# Patient Record
Sex: MALE | Race: WHITE | NOT HISPANIC OR LATINO | URBAN - METROPOLITAN AREA
[De-identification: names, ages, dates, MRNs, and addresses within clinical notes are randomized per-mention and may not be internally consistent; named-entity substitution may affect disease eponyms.]

---

## 2024-11-19 ENCOUNTER — HOME VISIT (OUTPATIENT)
Dept: POST ACUTE CARE | Facility: EXTERNAL LOCATION | Age: 89
End: 2024-11-19

## 2024-11-19 DIAGNOSIS — I25.118 CORONARY ARTERY DISEASE OF NATIVE ARTERY OF NATIVE HEART WITH STABLE ANGINA PECTORIS: Primary | ICD-10-CM

## 2024-11-19 DIAGNOSIS — R35.1 NOCTURIA: ICD-10-CM

## 2024-11-19 DIAGNOSIS — G25.0 ESSENTIAL TREMOR: ICD-10-CM

## 2024-11-19 DIAGNOSIS — I43 CARDIOMYOPATHY, HYPERTENSIVE, BENIGN, WITHOUT HEART FAILURE: ICD-10-CM

## 2024-11-19 DIAGNOSIS — E78.5 HYPERLIPIDEMIA, UNSPECIFIED HYPERLIPIDEMIA TYPE: ICD-10-CM

## 2024-11-19 DIAGNOSIS — M19.90 OSTEOARTHRITIS, UNSPECIFIED OSTEOARTHRITIS TYPE, UNSPECIFIED SITE: ICD-10-CM

## 2024-11-19 DIAGNOSIS — I11.9 CARDIOMYOPATHY, HYPERTENSIVE, BENIGN, WITHOUT HEART FAILURE: ICD-10-CM

## 2024-11-28 PROBLEM — I11.9 CARDIOMYOPATHY, HYPERTENSIVE, BENIGN, WITHOUT HEART FAILURE: Status: ACTIVE | Noted: 2024-11-28

## 2024-11-28 PROBLEM — I25.118 CORONARY ARTERY DISEASE OF NATIVE ARTERY OF NATIVE HEART WITH STABLE ANGINA PECTORIS: Status: ACTIVE | Noted: 2024-11-28

## 2024-11-28 PROBLEM — M19.90 OSTEOARTHRITIS: Status: ACTIVE | Noted: 2024-11-28

## 2024-11-28 PROBLEM — E78.5 HYPERLIPIDEMIA: Status: ACTIVE | Noted: 2024-11-28

## 2024-11-28 PROBLEM — I43 CARDIOMYOPATHY, HYPERTENSIVE, BENIGN, WITHOUT HEART FAILURE: Status: ACTIVE | Noted: 2024-11-28

## 2024-11-28 PROBLEM — G25.0 ESSENTIAL TREMOR: Status: ACTIVE | Noted: 2024-11-28

## 2024-11-28 PROBLEM — R35.1 NOCTURIA: Status: ACTIVE | Noted: 2024-11-28

## 2024-11-28 NOTE — PROGRESS NOTES
Danny Hughes   89 y.o.  male  @location@            Assessment and Plan:  History and physical    1. CAD (coronary artery disease) I25.10 Controlled. Continue atorvastatin prescribed by Dr. Mock.     2. HTN (hypertension) I10 Controlled. Continue metoprolol prescribed by Dr. Mock.      3. HLD (hyperlipidemia) E78.5 Controlled.      4. Essential tremor G25.0 Controlled with primidone. Continue same dose.     5. Nocturia R35.1 Urinalysis negative in office. Likely reflects post effects of prostate treatment.    6. Osteoarthritis M19.90 Symptoms are chronic, but unchanged. Should continue use of cane and walker.    7. Use of cane as ambulatory aid Z99.89     -Fall prevention    -Cognitive engagement     -Monitor and treat blood pressure     -Aggressive decubitus ulcer prevention.     -Bowel and bladder care     -Optimal nutrition and supplementation as needed     -GI  and DVT prophylaxis     -Symptom control     -Ambulation as tolerated     -Will follow    Charting is done using voice recognition software and may contain errors which have not been completely corrected    I discussed advanced care planning for more than 16 minutes including the explanation and discussion of advanced directives. Information and advise was also provided on DO NOT RESUSCITATE and patient encouraged to consider this  Patient is not sure about DNR at this time.      Source of history: Nurse, Medical personnel, Medical record, Patient.  History limitation: None.    HPI:  History and physical    Patient is unable to give any detailed history and therefore history is obtained from the chart  No acute complaints voiced by the patient or acute concerns raised by nursing    Patient is 88-year-old male with hypertension, coronary artery disease, hypercholesterolemia, ischemic cardiomyopathy (Dr. Mock), prostate cancer (PSA level 70-80; followed by Dr. Calvo), essential tremor, degenerative disc disease of the lumbar spine and chronic  sciatica.      Hospitalized March 2023 at [facility] because of severe right groin cellulitis. Received IV antibiotics. Followed by wound care and discharged on March 30: Completely healed cellulitis and intertrigo.  Patient/family declined GI scope in the hospital;    Problem List/Past Medical History     Ongoing   Anemia    Atrophy of muscle of left lower leg    Balance problems    CAD (coronary artery disease)    Candidal intertrigo    Cellulitis of right groin    Chronic ulcer of foot    Constipation    Coronary artery disease    Decreased vision in both eyes    Dermatophytosis, nail    Driving safety issue    Dysphagia    Essential tremor    Excessive cerumen in right ear canal    Fall at home    Fall down stairs    Hearing impaired    HLD (hyperlipidemia)    HTN (hypertension)    Left sciatic nerve pain    Medicare annual wellness visit, subsequent    MI, old    Muscle weakness    Nausea    Nocturia    Osteoarthritis    Peripheral vascular disease    Problem related to primary support group, unspecified    Prostate cancer    Scalp lesion    Scanning speech    Tremors of nervous system    Use of cane as ambulatory aid    Vitamin D deficiency    Weight loss, abnormal    Wound of right groin      Procedure/Surgical History   cataract extraction, bilat   mastoidectomy, right at 4 years of age   cardiac stent x 1 in 2005      Medications     aspirin(Aspirin Low Dose 81 mg oral tablet, chewable), 81 mg= 1 tabs, DAILY    atorvastatin(atorvastatin 10 mg oral tablet), 10 mg= 1 tabs, ORAL, DAILY, 3 refills    cholecalciferol(Vitamin D3 50 mcg (2000 intl units) oral capsule), 50 mcg= 1 caps, ORAL, DAILY, 3 refills    metoprolol(metoprolol succinate 25 mg oral tablet, extended release), 25 mg= 1 tabs, ORAL, DAILY, 3 refills    nitroglycerin(Nitrostat 0.4 mg sublingual tablet), 0.4 mg= 1 tabs, Sublingual, Q5MIN, PRN    nystatin topical = Mycostatin(Nyamyc 100,000 units/g topical powder), 1 warren, Topical, BID, 11  refills    Prescription DME(STANDARD FRAME WALKER), See Instructions    Prescription DME(PARKING PLACARD), See Instructions    Prescription DME(PARKING PLACARD), See Instructions    Prescription DME(DRIVING EVALUATION), See Instructions    primidone(primidone 50 mg oral tablet), 50 mg= 1 tabs, ORAL, QHS, 1 refills      Allergies     Vasotec    diclofenac    propranolol      Social History       Alcohol    Use:Current    Type:Liquor    Frequency:3-5 times per week    Previous treatment:None    *Have you ever felt you should cut down on drinking?No    *Have people annoyed you by criticizing your drinking?No    *Have you ever felt bad or guilty about your drinking?No    *Have you ever taken an eye-opener to get rid of a hangover?No      Domestic Concerns    Feels highly stressed:No      Employment/School    Status:Retired    Description:Retired     Highest education:Post graduate degree(s)      Exercise    Times per week:Daily    Exercise type:Walking      Home/Environment    Lives with:Spouse    *Living Situation Prior to AdmissionHome/Independent    Home equipment:Walker/Cane    Monitoring Equipment in homeblood pressure cuff    *Special Services and Community Resources Prior to AdmissionNone    *Mobility Assistance Prior to AdmissionIndependent    Home BarriersExternal Stairs, Internal Stairs    *Will patient require additional/new services upon discharge?No      Substance Abuse - Denies Substance Abuse      Tobacco    Type:Cigarettes    Stopped at age:25Years    Previous treatment:None      Family History     Cerebrovascular accident: Other.    Hypertension: Other.      Physical Exam:  Vital signs as per nursing/MA documentation were reviewed  General appearance: Alert and in no acute distress  HEENT: Normal Inspection  Neck - Normal Inspection  Respiratory : No respiratory distress. Lungs are clear   Cardiovascular: heart rate normal. No gallop  Back - normal inspection  Skin  inspection:Warm  Musculoskeletal : No deformities  Neuro : Limited exam. Baseline    ROS: Review of symptoms is negative except for what is mentioned in HPI    Results/Data  Records including but not limited to electronic medical records, relevant lab work and imaging from patient's health care facility encounter were reviewed and independently verified      Charting was completed using voice recognition technology and may include unintended errors.    Discussed with patient/family, RN, and NP.

## 2025-01-28 ENCOUNTER — HOME VISIT (OUTPATIENT)
Dept: POST ACUTE CARE | Facility: EXTERNAL LOCATION | Age: OVER 89
End: 2025-01-28
Payer: MEDICARE

## 2025-01-28 DIAGNOSIS — I43 CARDIOMYOPATHY, HYPERTENSIVE, BENIGN, WITHOUT HEART FAILURE: ICD-10-CM

## 2025-01-28 DIAGNOSIS — I11.9 CARDIOMYOPATHY, HYPERTENSIVE, BENIGN, WITHOUT HEART FAILURE: ICD-10-CM

## 2025-01-28 DIAGNOSIS — G25.0 ESSENTIAL TREMOR: ICD-10-CM

## 2025-01-28 DIAGNOSIS — M19.90 OSTEOARTHRITIS, UNSPECIFIED OSTEOARTHRITIS TYPE, UNSPECIFIED SITE: ICD-10-CM

## 2025-01-28 DIAGNOSIS — I25.118 CORONARY ARTERY DISEASE OF NATIVE ARTERY OF NATIVE HEART WITH STABLE ANGINA PECTORIS: Primary | ICD-10-CM

## 2025-01-28 PROCEDURE — 99349 HOME/RES VST EST MOD MDM 40: CPT | Performed by: EMERGENCY MEDICINE

## 2025-01-31 NOTE — PROGRESS NOTES
Danny Hughes   89 y.o.  male  @location@            Assessment and Plan:      1. CAD (coronary artery disease) I25.10 Controlled. Continue atorvastatin prescribed by Dr. Mock.     2. HTN (hypertension) I10 Controlled. Continue metoprolol prescribed by Dr. Mock.      3. HLD (hyperlipidemia) E78.5 Controlled.      4. Essential tremor G25.0 Controlled with primidone. Continue same dose.     5. Nocturia R35.1 Urinalysis negative in office. Likely reflects post effects of prostate treatment.    6. Osteoarthritis M19.90 Symptoms are chronic, but unchanged. Should continue use of cane and walker.    7. Use of cane as ambulatory aid Z99.89     -Fall prevention    -Cognitive engagement     -Monitor and treat blood pressure     -Aggressive decubitus ulcer prevention.     -Bowel and bladder care     -Optimal nutrition and supplementation as needed     -GI  and DVT prophylaxis     -Symptom control     -Ambulation as tolerated     -Will follow    Charting is done using voice recognition software and may contain errors which have not been completely corrected    -Fall prevention    -Cognitive engagement     -Monitor and treat blood pressure     -Aggressive decubitus ulcer prevention.     -Bowel and bladder care     -Optimal nutrition and supplementation as needed     -GI  and DVT prophylaxis     -Symptom control     -Ambulation as tolerated     -Will follow    Charting is done using voice recognition software and may contain errors which have not been completely corrected    Source of history: Nurse, Medical personnel, Medical record, Patient.  History limitation: None.    HPI:  History and physical    Patient is unable to give any detailed history and therefore history is obtained from the chart  No acute complaints voiced by the patient or acute concerns raised by nursing    Patient is 88-year-old male with hypertension, coronary artery disease, hypercholesterolemia, ischemic cardiomyopathy (Dr. Mock), prostate  cancer (PSA level 70-80; followed by Dr. Calvo), essential tremor, degenerative disc disease of the lumbar spine and chronic sciatica.      Hospitalized March 2023 at [facility] because of severe right groin cellulitis. Received IV antibiotics. Followed by wound care and discharged on March 30: Completely healed cellulitis and intertrigo.  Patient/family declined GI scope in the hospital;    Problem List/Past Medical History     Ongoing   Anemia    Atrophy of muscle of left lower leg    Balance problems    CAD (coronary artery disease)    Candidal intertrigo    Cellulitis of right groin    Chronic ulcer of foot    Constipation    Coronary artery disease    Decreased vision in both eyes    Dermatophytosis, nail    Driving safety issue    Dysphagia    Essential tremor    Excessive cerumen in right ear canal    Fall at home    Fall down stairs    Hearing impaired    HLD (hyperlipidemia)    HTN (hypertension)    Left sciatic nerve pain    Medicare annual wellness visit, subsequent    MI, old    Muscle weakness    Nausea    Nocturia    Osteoarthritis    Peripheral vascular disease    Problem related to primary support group, unspecified    Prostate cancer    Scalp lesion    Scanning speech    Tremors of nervous system    Use of cane as ambulatory aid    Vitamin D deficiency    Weight loss, abnormal    Wound of right groin      Procedure/Surgical History   cataract extraction, bilat   mastoidectomy, right at 4 years of age   cardiac stent x 1 in 2005      Medications     aspirin(Aspirin Low Dose 81 mg oral tablet, chewable), 81 mg= 1 tabs, DAILY    atorvastatin(atorvastatin 10 mg oral tablet), 10 mg= 1 tabs, ORAL, DAILY, 3 refills    cholecalciferol(Vitamin D3 50 mcg (2000 intl units) oral capsule), 50 mcg= 1 caps, ORAL, DAILY, 3 refills    metoprolol(metoprolol succinate 25 mg oral tablet, extended release), 25 mg= 1 tabs, ORAL, DAILY, 3 refills    nitroglycerin(Nitrostat 0.4 mg sublingual tablet), 0.4 mg= 1 tabs,  Sublingual, Q5MIN, PRN    nystatin topical = Mycostatin(Nyamyc 100,000 units/g topical powder), 1 warren, Topical, BID, 11 refills    Prescription DME(STANDARD FRAME WALKER), See Instructions    Prescription DME(PARKING PLACARD), See Instructions    Prescription DME(PARKING PLACARD), See Instructions    Prescription DME(DRIVING EVALUATION), See Instructions    primidone(primidone 50 mg oral tablet), 50 mg= 1 tabs, ORAL, QHS, 1 refills      Allergies     Vasotec    diclofenac    propranolol      Social History       Alcohol    Use:Current    Type:Liquor    Frequency:3-5 times per week    Previous treatment:None    *Have you ever felt you should cut down on drinking?No    *Have people annoyed you by criticizing your drinking?No    *Have you ever felt bad or guilty about your drinking?No    *Have you ever taken an eye-opener to get rid of a hangover?No      Domestic Concerns    Feels highly stressed:No      Employment/School    Status:Retired    Description:Retired     Highest education:Post graduate degree(s)      Exercise    Times per week:Daily    Exercise type:Walking      Home/Environment    Lives with:Spouse    *Living Situation Prior to AdmissionHome/Independent    Home equipment:Walker/Cane    Monitoring Equipment in homeblood pressure cuff    *Special Services and Community Resources Prior to AdmissionNone    *Mobility Assistance Prior to AdmissionIndependent    Home BarriersExternal Stairs, Internal Stairs    *Will patient require additional/new services upon discharge?No      Substance Abuse - Denies Substance Abuse      Tobacco    Type:Cigarettes    Stopped at age:25Years    Previous treatment:None      Family History     Cerebrovascular accident: Other.    Hypertension: Other.      Physical Exam:  Vital signs as per nursing/MA documentation were reviewed  General appearance: Alert and in no acute distress  HEENT: Normal Inspection  Neck - Normal Inspection  Respiratory : No respiratory distress. Lungs  are clear   Cardiovascular: heart rate normal. No gallop  Back - normal inspection  Skin inspection:Warm  Musculoskeletal : No deformities  Neuro : Limited exam. Baseline    ROS: Review of symptoms is negative except for what is mentioned in HPI    Results/Data  Records including but not limited to electronic medical records, relevant lab work and imaging from patient's health care facility encounter were reviewed and independently verified      Charting was completed using voice recognition technology and may include unintended errors.    Discussed with patient/family, RN, and NP.

## 2025-02-27 ENCOUNTER — HOME VISIT (OUTPATIENT)
Dept: POST ACUTE CARE | Facility: EXTERNAL LOCATION | Age: OVER 89
End: 2025-02-27
Payer: MEDICARE

## 2025-02-27 DIAGNOSIS — I11.9 CARDIOMYOPATHY, HYPERTENSIVE, BENIGN, WITHOUT HEART FAILURE: ICD-10-CM

## 2025-02-27 DIAGNOSIS — I43 CARDIOMYOPATHY, HYPERTENSIVE, BENIGN, WITHOUT HEART FAILURE: ICD-10-CM

## 2025-02-27 DIAGNOSIS — G25.0 ESSENTIAL TREMOR: ICD-10-CM

## 2025-02-27 DIAGNOSIS — M19.90 OSTEOARTHRITIS, UNSPECIFIED OSTEOARTHRITIS TYPE, UNSPECIFIED SITE: ICD-10-CM

## 2025-02-27 DIAGNOSIS — I25.118 CORONARY ARTERY DISEASE OF NATIVE ARTERY OF NATIVE HEART WITH STABLE ANGINA PECTORIS: Primary | ICD-10-CM

## 2025-02-27 PROCEDURE — 99348 HOME/RES VST EST LOW MDM 30: CPT | Performed by: EMERGENCY MEDICINE

## 2025-03-01 NOTE — PROGRESS NOTES
Danny Hughes   89 y.o.  male  @location@            Assessment and Plan:      1. CAD (coronary artery disease) I25.10 Controlled. Continue atorvastatin prescribed by Dr. Mock.     2. HTN (hypertension) I10 Controlled. Continue metoprolol prescribed by Dr. Mock.      3. HLD (hyperlipidemia) E78.5 Controlled.      4. Essential tremor G25.0 Controlled with primidone. Continue same dose.     5. Nocturia R35.1 Urinalysis negative in office. Likely reflects post effects of prostate treatment.    6. Osteoarthritis M19.90 Symptoms are chronic, but unchanged. Should continue use of cane and walker.    7. Use of cane as ambulatory aid Z99.89     -Fall prevention    -Cognitive engagement     -Monitor and treat blood pressure     -Aggressive decubitus ulcer prevention.     -Bowel and bladder care     -Optimal nutrition and supplementation as needed     -GI  and DVT prophylaxis     -Symptom control     -Ambulation as tolerated     -Will follow    Charting is done using voice recognition software and may contain errors which have not been completely corrected    Rx list reviewed.   PT and OT evaluation is in the process.   Routine safety measures, fall precautions, risk modification and alarm placement if needed for prevention of falls.   Skin care precautions, prevention of pressures sores at pressure points assessed.   Pt needs to be monitored frequently by nursing staff particularly at night time.   Any confusion, agitation or behavioural disturbance needs to be attended, as per home policy   rapid covid Ag assay need to be done, notify if positive.   If needed appropriate measures to be taken for alarm placements and assisted devices, pt was told not to get up and ambulate at night unless help and assist available at bedside,   labs will be done as per our routine protocol.   PO intake need to be monitored if consuming po.       Charting is done using voice recognition software and may contain errors which have not  been completely corrected      Source of history: Nurse, Medical personnel, Medical record, Patient.  History limitation: None.    HPI:  History and physical    Patient is unable to give any detailed history and therefore history is obtained from the chart  No acute complaints voiced by the patient or acute concerns raised by nursing    Patient is 88-year-old male with hypertension, coronary artery disease, hypercholesterolemia, ischemic cardiomyopathy (Dr. Mock), prostate cancer (PSA level 70-80; followed by Dr. Calvo), essential tremor, degenerative disc disease of the lumbar spine and chronic sciatica.      Hospitalized March 2023 at [facility] because of severe right groin cellulitis. Received IV antibiotics. Followed by wound care and discharged on March 30: Completely healed cellulitis and intertrigo.  Patient/family declined GI scope in the hospital;    Problem List/Past Medical History     Ongoing   Anemia    Atrophy of muscle of left lower leg    Balance problems    CAD (coronary artery disease)    Candidal intertrigo    Cellulitis of right groin    Chronic ulcer of foot    Constipation    Coronary artery disease    Decreased vision in both eyes    Dermatophytosis, nail    Driving safety issue    Dysphagia    Essential tremor    Excessive cerumen in right ear canal    Fall at home    Fall down stairs    Hearing impaired    HLD (hyperlipidemia)    HTN (hypertension)    Left sciatic nerve pain    Medicare annual wellness visit, subsequent    MI, old    Muscle weakness    Nausea    Nocturia    Osteoarthritis    Peripheral vascular disease    Problem related to primary support group, unspecified    Prostate cancer    Scalp lesion    Scanning speech    Tremors of nervous system    Use of cane as ambulatory aid    Vitamin D deficiency    Weight loss, abnormal    Wound of right groin      Procedure/Surgical History   cataract extraction, bilat   mastoidectomy, right at 4 years of age   cardiac stent x 1  in 2005      Medications     aspirin(Aspirin Low Dose 81 mg oral tablet, chewable), 81 mg= 1 tabs, DAILY    atorvastatin(atorvastatin 10 mg oral tablet), 10 mg= 1 tabs, ORAL, DAILY, 3 refills    cholecalciferol(Vitamin D3 50 mcg (2000 intl units) oral capsule), 50 mcg= 1 caps, ORAL, DAILY, 3 refills    metoprolol(metoprolol succinate 25 mg oral tablet, extended release), 25 mg= 1 tabs, ORAL, DAILY, 3 refills    nitroglycerin(Nitrostat 0.4 mg sublingual tablet), 0.4 mg= 1 tabs, Sublingual, Q5MIN, PRN    nystatin topical = Mycostatin(Nyamyc 100,000 units/g topical powder), 1 warren, Topical, BID, 11 refills    Prescription DME(STANDARD FRAME WALKER), See Instructions    Prescription DME(PARKING PLACARD), See Instructions    Prescription DME(PARKING PLACARD), See Instructions    Prescription DME(DRIVING EVALUATION), See Instructions    primidone(primidone 50 mg oral tablet), 50 mg= 1 tabs, ORAL, QHS, 1 refills      Allergies     Vasotec    diclofenac    propranolol      Social History       Alcohol    Use:Current    Type:Liquor    Frequency:3-5 times per week    Previous treatment:None    *Have you ever felt you should cut down on drinking?No    *Have people annoyed you by criticizing your drinking?No    *Have you ever felt bad or guilty about your drinking?No    *Have you ever taken an eye-opener to get rid of a hangover?No      Domestic Concerns    Feels highly stressed:No      Employment/School    Status:Retired    Description:Retired     Highest education:Post graduate degree(s)      Exercise    Times per week:Daily    Exercise type:Walking      Home/Environment    Lives with:Spouse    *Living Situation Prior to AdmissionHome/Independent    Home equipment:Walker/Cane    Monitoring Equipment in homeblood pressure cuff    *Special Services and Community Resources Prior to AdmissionNone    *Mobility Assistance Prior to AdmissionIndependent    Home BarriersExternal Stairs, Internal Stairs    *Will patient require  additional/new services upon discharge?No      Substance Abuse - Denies Substance Abuse      Tobacco    Type:Cigarettes    Stopped at age:25Years    Previous treatment:None      Family History     Cerebrovascular accident: Other.    Hypertension: Other.      Physical Exam:  Vital signs as per nursing/MA documentation were reviewed  General appearance: Alert and in no acute distress  HEENT: Normal Inspection  Neck - Normal Inspection  Respiratory : No respiratory distress. Lungs are clear   Cardiovascular: heart rate normal. No gallop  Back - normal inspection  Skin inspection:Warm  Musculoskeletal : No deformities  Neuro : Limited exam. Baseline    ROS: Review of symptoms is negative except for what is mentioned in HPI    Results/Data  Records including but not limited to electronic medical records, relevant lab work and imaging from patient's health care facility encounter were reviewed and independently verified      Charting was completed using voice recognition technology and may include unintended errors.    Discussed with patient/family, RN, and NP.

## 2025-03-20 ENCOUNTER — HOME VISIT (OUTPATIENT)
Dept: POST ACUTE CARE | Facility: EXTERNAL LOCATION | Age: OVER 89
End: 2025-03-20
Payer: MEDICARE

## 2025-03-20 DIAGNOSIS — I43 CARDIOMYOPATHY, HYPERTENSIVE, BENIGN, WITHOUT HEART FAILURE: Primary | ICD-10-CM

## 2025-03-20 DIAGNOSIS — G25.0 ESSENTIAL TREMOR: ICD-10-CM

## 2025-03-20 DIAGNOSIS — I11.9 CARDIOMYOPATHY, HYPERTENSIVE, BENIGN, WITHOUT HEART FAILURE: Primary | ICD-10-CM

## 2025-03-20 DIAGNOSIS — R35.1 NOCTURIA: ICD-10-CM

## 2025-03-20 DIAGNOSIS — E78.5 HYPERLIPIDEMIA, UNSPECIFIED HYPERLIPIDEMIA TYPE: ICD-10-CM

## 2025-03-20 PROCEDURE — 99349 HOME/RES VST EST MOD MDM 40: CPT | Performed by: EMERGENCY MEDICINE

## 2025-03-20 NOTE — PROGRESS NOTES
Danny Hughes   89 y.o.  male  @location@            Assessment and Plan:      1. CAD (coronary artery disease) I25.10 Controlled. Continue atorvastatin prescribed by Dr. Mock.     2. HTN (hypertension) I10 Controlled. Continue metoprolol prescribed by Dr. Mock.      3. HLD (hyperlipidemia) E78.5 Controlled.      4. Essential tremor G25.0 Controlled with primidone. Continue same dose.     5. Nocturia R35.1 Urinalysis negative in office. Likely reflects post effects of prostate treatment.    6. Osteoarthritis M19.90 Symptoms are chronic, but unchanged. Should continue use of cane and walker.    7. Use of cane as ambulatory aid Z99.89     1. medications are reviewed      2. Continue with rehabilitative, supportive, and or restorative care as ordered and as the patient tolerates     3. Laboratory evaluations will be monitored on an ongoing as needed basis     4. Medications have been cross-referenced with the patient's diagnoses list, and medications reductions have been considered and/or implemented.     5. Pharmacy recommendations are addressed on an ongoing as needed basis.     6. Controlled substances have been electronically scripted every 60 days for opiates and others of similar schedule, and every 6 months for sedative/hypnotics and others of similar schedule.     7. Nursing has been queried about any potential adverse events that need to be reported to me.    Salient information and adjustment of care plan pertaining to this individual patient interaction today are the following:      A. We will continue with restorative and supportive care as the patient tolerates    B. Laboratory examinations will continue to be drawn on an ongoing as-needed basis. The patient's weight needs to be monitored and if needed we may need to institute appetite stimulating medication    C. The patient's long term prognosis is guarded    Charting is done using voice recognition software and may contain errors which may not  have been completely corrected        Source of history: Nurse, Medical personnel, Medical record, Patient.  History limitation: None.    HPI:      Patient is unable to give any detailed history and therefore history is obtained from the chart  No acute complaints voiced by the patient or acute concerns raised by nursing    Patient is 88-year-old male with hypertension, coronary artery disease, hypercholesterolemia, ischemic cardiomyopathy (Dr. Mock), prostate cancer (PSA level 70-80; followed by Dr. Calvo), essential tremor, degenerative disc disease of the lumbar spine and chronic sciatica.      Hospitalized March 2023 at [facility] because of severe right groin cellulitis. Received IV antibiotics. Followed by wound care and discharged on March 30: Completely healed cellulitis and intertrigo.  Patient/family declined GI scope in the hospital;    Problem List/Past Medical History     Ongoing   Anemia    Atrophy of muscle of left lower leg    Balance problems    CAD (coronary artery disease)    Candidal intertrigo    Cellulitis of right groin    Chronic ulcer of foot    Constipation    Coronary artery disease    Decreased vision in both eyes    Dermatophytosis, nail    Driving safety issue    Dysphagia    Essential tremor    Excessive cerumen in right ear canal    Fall at home    Fall down stairs    Hearing impaired    HLD (hyperlipidemia)    HTN (hypertension)    Left sciatic nerve pain    Medicare annual wellness visit, subsequent    MI, old    Muscle weakness    Nausea    Nocturia    Osteoarthritis    Peripheral vascular disease    Problem related to primary support group, unspecified    Prostate cancer    Scalp lesion    Scanning speech    Tremors of nervous system    Use of cane as ambulatory aid    Vitamin D deficiency    Weight loss, abnormal    Wound of right groin      Procedure/Surgical History   cataract extraction, bilat   mastoidectomy, right at 4 years of age   cardiac stent x 1 in 2005       Medications     aspirin(Aspirin Low Dose 81 mg oral tablet, chewable), 81 mg= 1 tabs, DAILY    atorvastatin(atorvastatin 10 mg oral tablet), 10 mg= 1 tabs, ORAL, DAILY, 3 refills    cholecalciferol(Vitamin D3 50 mcg (2000 intl units) oral capsule), 50 mcg= 1 caps, ORAL, DAILY, 3 refills    metoprolol(metoprolol succinate 25 mg oral tablet, extended release), 25 mg= 1 tabs, ORAL, DAILY, 3 refills    nitroglycerin(Nitrostat 0.4 mg sublingual tablet), 0.4 mg= 1 tabs, Sublingual, Q5MIN, PRN    nystatin topical = Mycostatin(Nyamyc 100,000 units/g topical powder), 1 warren, Topical, BID, 11 refills    Prescription DME(STANDARD FRAME WALKER), See Instructions    Prescription DME(PARKING PLACARD), See Instructions    Prescription DME(PARKING PLACARD), See Instructions    Prescription DME(DRIVING EVALUATION), See Instructions    primidone(primidone 50 mg oral tablet), 50 mg= 1 tabs, ORAL, QHS, 1 refills      Allergies     Vasotec    diclofenac    propranolol      Social History       Alcohol    Use:Current    Type:Liquor    Frequency:3-5 times per week    Previous treatment:None    *Have you ever felt you should cut down on drinking?No    *Have people annoyed you by criticizing your drinking?No    *Have you ever felt bad or guilty about your drinking?No    *Have you ever taken an eye-opener to get rid of a hangover?No      Domestic Concerns    Feels highly stressed:No      Employment/School    Status:Retired    Description:Retired     Highest education:Post graduate degree(s)      Exercise    Times per week:Daily    Exercise type:Walking      Home/Environment    Lives with:Spouse    *Living Situation Prior to AdmissionHome/Independent    Home equipment:Walker/Cane    Monitoring Equipment in homeblood pressure cuff    *Special Services and Community Resources Prior to AdmissionNone    *Mobility Assistance Prior to AdmissionIndependent    Home BarriersExternal Stairs, Internal Stairs    *Will patient require  additional/new services upon discharge?No      Substance Abuse - Denies Substance Abuse      Tobacco    Type:Cigarettes    Stopped at age:25Years    Previous treatment:None      Family History     Cerebrovascular accident: Other.    Hypertension: Other.      Physical Exam:  Vital signs as per nursing/MA documentation were reviewed  General appearance: Alert and in no acute distress  HEENT: Normal Inspection  Neck - Normal Inspection  Respiratory : No respiratory distress. Lungs are clear   Cardiovascular: heart rate normal. No gallop  Back - normal inspection  Skin inspection:Warm  Musculoskeletal : No deformities  Neuro : Limited exam. Baseline    ROS: Review of symptoms is negative except for what is mentioned in HPI    Results/Data  Records including but not limited to electronic medical records, relevant lab work and imaging from patient's health care facility encounter were reviewed and independently verified      Charting was completed using voice recognition technology and may include unintended errors.    Discussed with patient/family, RN, and NP.

## 2025-04-22 ENCOUNTER — HOME VISIT (OUTPATIENT)
Dept: POST ACUTE CARE | Facility: EXTERNAL LOCATION | Age: OVER 89
End: 2025-04-22
Payer: MEDICARE

## 2025-04-22 DIAGNOSIS — I43 CARDIOMYOPATHY, HYPERTENSIVE, BENIGN, WITHOUT HEART FAILURE: ICD-10-CM

## 2025-04-22 DIAGNOSIS — I11.9 CARDIOMYOPATHY, HYPERTENSIVE, BENIGN, WITHOUT HEART FAILURE: ICD-10-CM

## 2025-04-22 DIAGNOSIS — I25.118 CORONARY ARTERY DISEASE OF NATIVE ARTERY OF NATIVE HEART WITH STABLE ANGINA PECTORIS: Primary | ICD-10-CM

## 2025-04-22 DIAGNOSIS — E78.5 HYPERLIPIDEMIA, UNSPECIFIED HYPERLIPIDEMIA TYPE: ICD-10-CM

## 2025-04-22 DIAGNOSIS — G25.0 ESSENTIAL TREMOR: ICD-10-CM

## 2025-04-26 NOTE — PROGRESS NOTES
Danny Hughes   89 y.o.  male  @location@            Assessment and Plan:      1. CAD (coronary artery disease) I25.10 Controlled. Continue atorvastatin prescribed by Dr. Mock.     2. HTN (hypertension) I10 Controlled. Continue metoprolol prescribed by Dr. Mock.      3. HLD (hyperlipidemia) E78.5 Controlled.      4. Essential tremor G25.0 Controlled with primidone. Continue same dose.     5. Nocturia R35.1 Urinalysis negative in office. Likely reflects post effects of prostate treatment.    6. Osteoarthritis M19.90 Symptoms are chronic, but unchanged. Should continue use of cane and walker.    7. Use of cane as ambulatory aid Z99.89     This patient was seen for my regular monthly visit, nursing evaluations and nursing notes were reviewed, interim events are reviewed, interim concerns and messages were reviewed as we have communicated with nursing staff.  Any issues with the falls, skin care impairment, declining physical condition are reviewed and noted, diagnosis list were reviewed, list of medications were reviewed, living will related issues were reviewed, overall patient has been doing well, any declining in patient's condition or any change in patient's condition needs to be notified to physician promptly, discussed with nursing staff, if needed would communicate with family.  Patient stays confined here at the facility for long-term care, there are always concerns about long-term care related issues and concerns.  Nursing staff is trying their best to keep patient safe, all sort of measures has been taken to keep patient safe and comfortable.         Source of history: Nurse, Medical personnel, Medical record, Patient.  History limitation: None.    HPI:      Patient is unable to give any detailed history and therefore history is obtained from the chart  No acute complaints voiced by the patient or acute concerns raised by nursing    Patient is 88-year-old male with hypertension, coronary artery  disease, hypercholesterolemia, ischemic cardiomyopathy (Dr. Mock), prostate cancer (PSA level 70-80; followed by Dr. Calvo), essential tremor, degenerative disc disease of the lumbar spine and chronic sciatica.      Hospitalized March 2023 at [facility] because of severe right groin cellulitis. Received IV antibiotics. Followed by wound care and discharged on March 30: Completely healed cellulitis and intertrigo.  Patient/family declined GI scope in the hospital;    Problem List/Past Medical History     Ongoing   Anemia    Atrophy of muscle of left lower leg    Balance problems    CAD (coronary artery disease)    Candidal intertrigo    Cellulitis of right groin    Chronic ulcer of foot    Constipation    Coronary artery disease    Decreased vision in both eyes    Dermatophytosis, nail    Driving safety issue    Dysphagia    Essential tremor    Excessive cerumen in right ear canal    Fall at home    Fall down stairs    Hearing impaired    HLD (hyperlipidemia)    HTN (hypertension)    Left sciatic nerve pain    Medicare annual wellness visit, subsequent    MI, old    Muscle weakness    Nausea    Nocturia    Osteoarthritis    Peripheral vascular disease    Problem related to primary support group, unspecified    Prostate cancer    Scalp lesion    Scanning speech    Tremors of nervous system    Use of cane as ambulatory aid    Vitamin D deficiency    Weight loss, abnormal    Wound of right groin      Procedure/Surgical History   cataract extraction, bilat   mastoidectomy, right at 4 years of age   cardiac stent x 1 in 2005      Medications     aspirin(Aspirin Low Dose 81 mg oral tablet, chewable), 81 mg= 1 tabs, DAILY    atorvastatin(atorvastatin 10 mg oral tablet), 10 mg= 1 tabs, ORAL, DAILY, 3 refills    cholecalciferol(Vitamin D3 50 mcg (2000 intl units) oral capsule), 50 mcg= 1 caps, ORAL, DAILY, 3 refills    metoprolol(metoprolol succinate 25 mg oral tablet, extended release), 25 mg= 1 tabs, ORAL, DAILY,  3 refills    nitroglycerin(Nitrostat 0.4 mg sublingual tablet), 0.4 mg= 1 tabs, Sublingual, Q5MIN, PRN    nystatin topical = Mycostatin(Nyamyc 100,000 units/g topical powder), 1 warren, Topical, BID, 11 refills    Prescription DME(STANDARD FRAME WALKER), See Instructions    Prescription DME(PARKING PLACARD), See Instructions    Prescription DME(PARKING PLACARD), See Instructions    Prescription DME(DRIVING EVALUATION), See Instructions    primidone(primidone 50 mg oral tablet), 50 mg= 1 tabs, ORAL, QHS, 1 refills      Allergies     Vasotec    diclofenac    propranolol      Social History       Alcohol    Use:Current    Type:Liquor    Frequency:3-5 times per week    Previous treatment:None    *Have you ever felt you should cut down on drinking?No    *Have people annoyed you by criticizing your drinking?No    *Have you ever felt bad or guilty about your drinking?No    *Have you ever taken an eye-opener to get rid of a hangover?No      Domestic Concerns    Feels highly stressed:No      Employment/School    Status:Retired    Description:Retired     Highest education:Post graduate degree(s)      Exercise    Times per week:Daily    Exercise type:Walking      Home/Environment    Lives with:Spouse    *Living Situation Prior to AdmissionHome/Independent    Home equipment:Walker/Cane    Monitoring Equipment in homeblood pressure cuff    *Special Services and Community Resources Prior to AdmissionNone    *Mobility Assistance Prior to AdmissionIndependent    Home BarriersExternal Stairs, Internal Stairs    *Will patient require additional/new services upon discharge?No      Substance Abuse - Denies Substance Abuse      Tobacco    Type:Cigarettes    Stopped at age:25Years    Previous treatment:None      Family History     Cerebrovascular accident: Other.    Hypertension: Other.      Physical Exam:  Vital signs as per nursing/MA documentation were reviewed  General appearance: Alert and in no acute distress  HEENT: Normal  Inspection  Neck - Normal Inspection  Respiratory : No respiratory distress. Lungs are clear   Cardiovascular: heart rate normal. No gallop  Back - normal inspection  Skin inspection:Warm  Musculoskeletal : No deformities  Neuro : Limited exam. Baseline    ROS: Review of symptoms is negative except for what is mentioned in HPI    Results/Data  Records including but not limited to electronic medical records, relevant lab work and imaging from patient's health care facility encounter were reviewed and independently verified      Charting was completed using voice recognition technology and may include unintended errors.    Discussed with patient/family, RN, and NP.

## 2025-05-27 ENCOUNTER — HOME VISIT (OUTPATIENT)
Dept: POST ACUTE CARE | Facility: EXTERNAL LOCATION | Age: OVER 89
End: 2025-05-27
Payer: MEDICARE

## 2025-05-27 DIAGNOSIS — I25.118 CORONARY ARTERY DISEASE OF NATIVE ARTERY OF NATIVE HEART WITH STABLE ANGINA PECTORIS: Primary | ICD-10-CM

## 2025-05-27 DIAGNOSIS — G25.0 ESSENTIAL TREMOR: ICD-10-CM

## 2025-05-27 DIAGNOSIS — I43 CARDIOMYOPATHY, HYPERTENSIVE, BENIGN, WITHOUT HEART FAILURE: ICD-10-CM

## 2025-05-27 DIAGNOSIS — M19.90 OSTEOARTHRITIS, UNSPECIFIED OSTEOARTHRITIS TYPE, UNSPECIFIED SITE: ICD-10-CM

## 2025-05-27 DIAGNOSIS — I11.9 CARDIOMYOPATHY, HYPERTENSIVE, BENIGN, WITHOUT HEART FAILURE: ICD-10-CM

## 2025-05-27 PROCEDURE — 99349 HOME/RES VST EST MOD MDM 40: CPT | Performed by: EMERGENCY MEDICINE

## 2025-05-29 NOTE — PROGRESS NOTES
Danny Hughes   89 y.o.  male  @location@            Assessment and Plan:      1. CAD (coronary artery disease) I25.10 Controlled. Continue atorvastatin prescribed by Dr. Mock.     2. HTN (hypertension) I10 Controlled. Continue metoprolol prescribed by Dr. Mock.      3. HLD (hyperlipidemia) E78.5 Controlled.      4. Essential tremor G25.0 Controlled with primidone. Continue same dose.     5. Nocturia R35.1 Urinalysis negative in office. Likely reflects post effects of prostate treatment.    6. Osteoarthritis M19.90 Symptoms are chronic, but unchanged. Should continue use of cane and walker.    7. Use of cane as ambulatory aid Z99.89     This patient was seen for my regular monthly visit, nursing evaluations and nursing notes were reviewed, interim events are reviewed, interim concerns and messages were reviewed as we have communicated with nursing staff.  Any issues with the falls, skin care impairment, declining physical condition are reviewed and noted, diagnosis list were reviewed, list of medications were reviewed, living will related issues were reviewed, overall patient has been doing well, any declining in patient's condition or any change in patient's condition needs to be notified to physician promptly, discussed with nursing staff, if needed would communicate with family.  Patient stays confined here at the facility for long-term care, there are always concerns about long-term care related issues and concerns.  Nursing staff is trying their best to keep patient safe, all sort of measures has been taken to keep patient safe and comfortable.   Skin integrity:  Nursing to monitor skin integrity as patient is at risk for pressure injuries.  Wound care per nursing  See Facility notes for measurements/assessments  Turn and reposition Q 2 hours or more  Air mattress and when up in chair cushion reducing device  Dietician to evaluate and recommend:  Nutritional supplement:  Please monitor skin integrity  Left message with estradiol level.  Informed patient that dr House would like her to begin IM estrogen 1 vial ordered to nick.  Instructed to call back for instructions.     and other pressure areas  Referral to wound clinic if appropriate:     Pt has been seen for follow up visit.  Recent nursing evaluation and notes were reviewed.   Overall, patient is stable despite his/her chronic conditions.      Any decline or change in condition needs to be communicated with the physician or myself.    Discussion with nursing staff regarding ongoing care and management.  If needed, would communicate with family who are not present at this time.   There are no concerns at this time.  We will continue with the medications noted above.    We will continue to follow the patient here at the facility.      *Please note that nursing facility, outside laboratory agency, and [facility] do not interface.         Source of history: Nurse, Medical personnel, Medical record, Patient.  History limitation: None.    HPI:      Patient is unable to give any detailed history and therefore history is obtained from the chart  No acute complaints voiced by the patient or acute concerns raised by nursing    Patient is 88-year-old male with hypertension, coronary artery disease, hypercholesterolemia, ischemic cardiomyopathy (Dr. Mock), prostate cancer (PSA level 70-80; followed by Dr. Calvo), essential tremor, degenerative disc disease of the lumbar spine and chronic sciatica.      Hospitalized March 2023 at [facility] because of severe right groin cellulitis. Received IV antibiotics. Followed by wound care and discharged on March 30: Completely healed cellulitis and intertrigo.  Patient/family declined GI scope in the hospital;    Problem List/Past Medical History     Ongoing   Anemia    Atrophy of muscle of left lower leg    Balance problems    CAD (coronary artery disease)    Candidal intertrigo    Cellulitis of right groin    Chronic ulcer of foot    Constipation    Coronary artery disease    Decreased vision in both eyes    Dermatophytosis, nail    Driving safety issue    Dysphagia    Essential  tremor    Excessive cerumen in right ear canal    Fall at home    Fall down stairs    Hearing impaired    HLD (hyperlipidemia)    HTN (hypertension)    Left sciatic nerve pain    Medicare annual wellness visit, subsequent    MI, old    Muscle weakness    Nausea    Nocturia    Osteoarthritis    Peripheral vascular disease    Problem related to primary support group, unspecified    Prostate cancer    Scalp lesion    Scanning speech    Tremors of nervous system    Use of cane as ambulatory aid    Vitamin D deficiency    Weight loss, abnormal    Wound of right groin      Procedure/Surgical History   cataract extraction, bilat   mastoidectomy, right at 4 years of age   cardiac stent x 1 in 2005      Medications     aspirin(Aspirin Low Dose 81 mg oral tablet, chewable), 81 mg= 1 tabs, DAILY    atorvastatin(atorvastatin 10 mg oral tablet), 10 mg= 1 tabs, ORAL, DAILY, 3 refills    cholecalciferol(Vitamin D3 50 mcg (2000 intl units) oral capsule), 50 mcg= 1 caps, ORAL, DAILY, 3 refills    metoprolol(metoprolol succinate 25 mg oral tablet, extended release), 25 mg= 1 tabs, ORAL, DAILY, 3 refills    nitroglycerin(Nitrostat 0.4 mg sublingual tablet), 0.4 mg= 1 tabs, Sublingual, Q5MIN, PRN    nystatin topical = Mycostatin(Nyamyc 100,000 units/g topical powder), 1 warren, Topical, BID, 11 refills    Prescription DME(STANDARD FRAME WALKER), See Instructions    Prescription DME(PARKING PLACARD), See Instructions    Prescription DME(PARKING PLACARD), See Instructions    Prescription DME(DRIVING EVALUATION), See Instructions    primidone(primidone 50 mg oral tablet), 50 mg= 1 tabs, ORAL, QHS, 1 refills      Allergies     Vasotec    diclofenac    propranolol      Social History       Alcohol    Use:Current    Type:Liquor    Frequency:3-5 times per week    Previous treatment:None    *Have you ever felt you should cut down on drinking?No    *Have people annoyed you by criticizing your drinking?No    *Have you ever felt bad or guilty about  your drinking?No    *Have you ever taken an eye-opener to get rid of a hangover?No      Domestic Concerns    Feels highly stressed:No      Employment/School    Status:Retired    Description:Retired     Highest education:Post graduate degree(s)      Exercise    Times per week:Daily    Exercise type:Walking      Home/Environment    Lives with:Spouse    *Living Situation Prior to AdmissionHome/Independent    Home equipment:Walker/Cane    Monitoring Equipment in homeblood pressure cuff    *Special Services and Community Resources Prior to AdmissionNone    *Mobility Assistance Prior to AdmissionIndependent    Home BarriersExternal Stairs, Internal Stairs    *Will patient require additional/new services upon discharge?No      Substance Abuse - Denies Substance Abuse      Tobacco    Type:Cigarettes    Stopped at age:25Years    Previous treatment:None      Family History     Cerebrovascular accident: Other.    Hypertension: Other.      Physical Exam:  Vital signs as per nursing/MA documentation were reviewed  General appearance: Alert and in no acute distress  HEENT: Normal Inspection  Neck - Normal Inspection  Respiratory : No respiratory distress. Lungs are clear   Cardiovascular: heart rate normal. No gallop  Back - normal inspection  Skin inspection:Warm  Musculoskeletal : No deformities  Neuro : Limited exam. Baseline    ROS: Review of symptoms is negative except for what is mentioned in HPI    Results/Data  Records including but not limited to electronic medical records, relevant lab work and imaging from patient's health care facility encounter were reviewed and independently verified      Charting was completed using voice recognition technology and may include unintended errors.    Discussed with patient/family, RN, and NP.

## 2025-06-17 ENCOUNTER — HOME VISIT (OUTPATIENT)
Dept: POST ACUTE CARE | Facility: EXTERNAL LOCATION | Age: OVER 89
End: 2025-06-17
Payer: MEDICARE

## 2025-06-17 DIAGNOSIS — M19.90 OSTEOARTHRITIS, UNSPECIFIED OSTEOARTHRITIS TYPE, UNSPECIFIED SITE: ICD-10-CM

## 2025-06-17 DIAGNOSIS — I25.118 CORONARY ARTERY DISEASE OF NATIVE ARTERY OF NATIVE HEART WITH STABLE ANGINA PECTORIS: Primary | ICD-10-CM

## 2025-06-17 DIAGNOSIS — I11.9 CARDIOMYOPATHY, HYPERTENSIVE, BENIGN, WITHOUT HEART FAILURE: ICD-10-CM

## 2025-06-17 DIAGNOSIS — I43 CARDIOMYOPATHY, HYPERTENSIVE, BENIGN, WITHOUT HEART FAILURE: ICD-10-CM

## 2025-06-17 DIAGNOSIS — G25.0 ESSENTIAL TREMOR: ICD-10-CM

## 2025-06-17 PROCEDURE — 99348 HOME/RES VST EST LOW MDM 30: CPT | Performed by: EMERGENCY MEDICINE

## 2025-06-29 NOTE — PROGRESS NOTES
Danny Hughes   89 y.o.  male  @location@            Assessment and Plan:      1. CAD (coronary artery disease) I25.10 Controlled. Continue atorvastatin prescribed by Dr. Mock.     2. HTN (hypertension) I10 Controlled. Continue metoprolol prescribed by Dr. Mock.      3. HLD (hyperlipidemia) E78.5 Controlled.      4. Essential tremor G25.0 Controlled with primidone. Continue same dose.     5. Nocturia R35.1 Urinalysis negative in office. Likely reflects post effects of prostate treatment.    6. Osteoarthritis M19.90 Symptoms are chronic, but unchanged. Should continue use of cane and walker.    7. Use of cane as ambulatory aid Z99.89     All available hospital and outpatient records have been reviewed. Will continue other current drug therapies as ordered on the continuation of care documents. Physical and Occupational Therapy will assess and treat per POC. Analgesia for identified pain symptoms. Continue the various medicines for bowel and bladder symptoms as well as the vitamins and supplements per hospital instructions. Will assess and provide local care to abnormalities of skin integrity. Drug to drug interaction data as noted by the pharmacy has been reviewed. The patient's condition warrants the continuation of these drugs as prescribed by the medical specialists. Discharge planning will be coordinated through the  department. I have reviewed any advanced directive documentation that is contained in the admission packet as directives indicating whether a surrogate decision maker has been identified.         Source of history: Nurse, Medical personnel, Medical record, Patient.  History limitation: None.    HPI:      Patient is unable to give any detailed history and therefore history is obtained from the chart  No acute complaints voiced by the patient or acute concerns raised by nursing    Patient is 88-year-old male with hypertension, coronary artery disease, hypercholesterolemia,  ischemic cardiomyopathy (Dr. Mock), prostate cancer (PSA level 70-80; followed by Dr. Calvo), essential tremor, degenerative disc disease of the lumbar spine and chronic sciatica.      Hospitalized March 2023 at [facility] because of severe right groin cellulitis. Received IV antibiotics. Followed by wound care and discharged on March 30: Completely healed cellulitis and intertrigo.  Patient/family declined GI scope in the hospital;    Problem List/Past Medical History     Ongoing   Anemia    Atrophy of muscle of left lower leg    Balance problems    CAD (coronary artery disease)    Candidal intertrigo    Cellulitis of right groin    Chronic ulcer of foot    Constipation    Coronary artery disease    Decreased vision in both eyes    Dermatophytosis, nail    Driving safety issue    Dysphagia    Essential tremor    Excessive cerumen in right ear canal    Fall at home    Fall down stairs    Hearing impaired    HLD (hyperlipidemia)    HTN (hypertension)    Left sciatic nerve pain    Medicare annual wellness visit, subsequent    MI, old    Muscle weakness    Nausea    Nocturia    Osteoarthritis    Peripheral vascular disease    Problem related to primary support group, unspecified    Prostate cancer    Scalp lesion    Scanning speech    Tremors of nervous system    Use of cane as ambulatory aid    Vitamin D deficiency    Weight loss, abnormal    Wound of right groin      Procedure/Surgical History   cataract extraction, bilat   mastoidectomy, right at 4 years of age   cardiac stent x 1 in 2005      Medications     aspirin(Aspirin Low Dose 81 mg oral tablet, chewable), 81 mg= 1 tabs, DAILY    atorvastatin(atorvastatin 10 mg oral tablet), 10 mg= 1 tabs, ORAL, DAILY, 3 refills    cholecalciferol(Vitamin D3 50 mcg (2000 intl units) oral capsule), 50 mcg= 1 caps, ORAL, DAILY, 3 refills    metoprolol(metoprolol succinate 25 mg oral tablet, extended release), 25 mg= 1 tabs, ORAL, DAILY, 3  refills    nitroglycerin(Nitrostat 0.4 mg sublingual tablet), 0.4 mg= 1 tabs, Sublingual, Q5MIN, PRN    nystatin topical = Mycostatin(Nyamyc 100,000 units/g topical powder), 1 warren, Topical, BID, 11 refills    Prescription DME(STANDARD FRAME WALKER), See Instructions    Prescription DME(PARKING PLACARD), See Instructions    Prescription DME(PARKING PLACARD), See Instructions    Prescription DME(DRIVING EVALUATION), See Instructions    primidone(primidone 50 mg oral tablet), 50 mg= 1 tabs, ORAL, QHS, 1 refills      Allergies     Vasotec    diclofenac    propranolol      Social History       Alcohol    Use:Current    Type:Liquor    Frequency:3-5 times per week    Previous treatment:None    *Have you ever felt you should cut down on drinking?No    *Have people annoyed you by criticizing your drinking?No    *Have you ever felt bad or guilty about your drinking?No    *Have you ever taken an eye-opener to get rid of a hangover?No      Domestic Concerns    Feels highly stressed:No      Employment/School    Status:Retired    Description:Retired     Highest education:Post graduate degree(s)      Exercise    Times per week:Daily    Exercise type:Walking      Home/Environment    Lives with:Spouse    *Living Situation Prior to AdmissionHome/Independent    Home equipment:Walker/Cane    Monitoring Equipment in homeblood pressure cuff    *Special Services and Community Resources Prior to AdmissionNone    *Mobility Assistance Prior to AdmissionIndependent    Home BarriersExternal Stairs, Internal Stairs    *Will patient require additional/new services upon discharge?No      Substance Abuse - Denies Substance Abuse      Tobacco    Type:Cigarettes    Stopped at age:25Years    Previous treatment:None      Family History     Cerebrovascular accident: Other.    Hypertension: Other.      Physical Exam:  Vital signs as per nursing/MA documentation were reviewed  General appearance: Alert and in no acute distress  HEENT: Normal  Inspection  Neck - Normal Inspection  Respiratory : No respiratory distress. Lungs are clear   Cardiovascular: heart rate normal. No gallop  Back - normal inspection  Skin inspection:Warm  Musculoskeletal : No deformities  Neuro : Limited exam. Baseline    ROS: Review of symptoms is negative except for what is mentioned in HPI    Results/Data  Records including but not limited to electronic medical records, relevant lab work and imaging from patient's health care facility encounter were reviewed and independently verified      Charting was completed using voice recognition technology and may include unintended errors.    Discussed with patient/family, RN, and NP.

## 2025-07-29 ENCOUNTER — HOME VISIT (OUTPATIENT)
Dept: POST ACUTE CARE | Facility: EXTERNAL LOCATION | Age: OVER 89
End: 2025-07-29
Payer: MEDICARE

## 2025-07-29 DIAGNOSIS — E78.5 HYPERLIPIDEMIA, UNSPECIFIED HYPERLIPIDEMIA TYPE: ICD-10-CM

## 2025-07-29 DIAGNOSIS — I25.118 CORONARY ARTERY DISEASE OF NATIVE ARTERY OF NATIVE HEART WITH STABLE ANGINA PECTORIS: ICD-10-CM

## 2025-07-29 DIAGNOSIS — I43 CARDIOMYOPATHY, HYPERTENSIVE, BENIGN, WITHOUT HEART FAILURE: ICD-10-CM

## 2025-07-29 DIAGNOSIS — G25.0 ESSENTIAL TREMOR: Primary | ICD-10-CM

## 2025-07-29 DIAGNOSIS — I11.9 CARDIOMYOPATHY, HYPERTENSIVE, BENIGN, WITHOUT HEART FAILURE: ICD-10-CM

## 2025-07-29 DIAGNOSIS — M19.90 OSTEOARTHRITIS, UNSPECIFIED OSTEOARTHRITIS TYPE, UNSPECIFIED SITE: ICD-10-CM

## 2025-07-29 PROCEDURE — 99349 HOME/RES VST EST MOD MDM 40: CPT | Performed by: EMERGENCY MEDICINE

## 2025-07-31 NOTE — PROGRESS NOTES
Danny Hughes   89 y.o.  male  @location@            Assessment and Plan:      1. CAD (coronary artery disease) I25.10 Controlled. Continue atorvastatin prescribed by Dr. Mock.     2. HTN (hypertension) I10 Controlled. Continue metoprolol prescribed by Dr. Mock.      3. HLD (hyperlipidemia) E78.5 Controlled.      4. Essential tremor G25.0 Controlled with primidone. Continue same dose.     5. Nocturia R35.1 Urinalysis negative in office. Likely reflects post effects of prostate treatment.    6. Osteoarthritis M19.90 Symptoms are chronic, but unchanged. Should continue use of cane and walker.    7. Use of cane as ambulatory aid Z99.89     -Fall prevention    -Cognitive engagement     -Monitor and treat blood pressure     -Aggressive decubitus ulcer prevention.     -Bowel and bladder care     -Optimal nutrition and supplementation as needed     -GI  and DVT prophylaxis     -Symptom control     -Ambulation as tolerated     -Will follow    Charting is done using voice recognition software and may contain errors which have not been completely corrected          Source of history: Nurse, Medical personnel, Medical record, Patient.  History limitation: None.    HPI:      Patient is unable to give any detailed history and therefore history is obtained from the chart  No acute complaints voiced by the patient or acute concerns raised by nursing    Patient is 88-year-old male with hypertension, coronary artery disease, hypercholesterolemia, ischemic cardiomyopathy (Dr. Mock), prostate cancer (PSA level 70-80; followed by Dr. Calvo), essential tremor, degenerative disc disease of the lumbar spine and chronic sciatica.      Hospitalized March 2023 at [facility] because of severe right groin cellulitis. Received IV antibiotics. Followed by wound care and discharged on March 30: Completely healed cellulitis and intertrigo.  Patient/family declined GI scope in the hospital;    Problem List/Past Medical History      Ongoing   Anemia    Atrophy of muscle of left lower leg    Balance problems    CAD (coronary artery disease)    Candidal intertrigo    Cellulitis of right groin    Chronic ulcer of foot    Constipation    Coronary artery disease    Decreased vision in both eyes    Dermatophytosis, nail    Driving safety issue    Dysphagia    Essential tremor    Excessive cerumen in right ear canal    Fall at home    Fall down stairs    Hearing impaired    HLD (hyperlipidemia)    HTN (hypertension)    Left sciatic nerve pain    Medicare annual wellness visit, subsequent    MI, old    Muscle weakness    Nausea    Nocturia    Osteoarthritis    Peripheral vascular disease    Problem related to primary support group, unspecified    Prostate cancer    Scalp lesion    Scanning speech    Tremors of nervous system    Use of cane as ambulatory aid    Vitamin D deficiency    Weight loss, abnormal    Wound of right groin      Procedure/Surgical History   cataract extraction, bilat   mastoidectomy, right at 4 years of age   cardiac stent x 1 in 2005      Medications     aspirin(Aspirin Low Dose 81 mg oral tablet, chewable), 81 mg= 1 tabs, DAILY    atorvastatin(atorvastatin 10 mg oral tablet), 10 mg= 1 tabs, ORAL, DAILY, 3 refills    cholecalciferol(Vitamin D3 50 mcg (2000 intl units) oral capsule), 50 mcg= 1 caps, ORAL, DAILY, 3 refills    metoprolol(metoprolol succinate 25 mg oral tablet, extended release), 25 mg= 1 tabs, ORAL, DAILY, 3 refills    nitroglycerin(Nitrostat 0.4 mg sublingual tablet), 0.4 mg= 1 tabs, Sublingual, Q5MIN, PRN    nystatin topical = Mycostatin(Nyamyc 100,000 units/g topical powder), 1 warren, Topical, BID, 11 refills    Prescription DME(STANDARD FRAME WALKER), See Instructions    Prescription DME(PARKING PLACARD), See Instructions    Prescription DME(PARKING PLACARD), See Instructions    Prescription DME(DRIVING EVALUATION), See Instructions    primidone(primidone 50 mg oral tablet), 50 mg= 1 tabs, ORAL, QHS, 1  refills      Allergies     Vasotec    diclofenac    propranolol      Social History       Alcohol    Use:Current    Type:Liquor    Frequency:3-5 times per week    Previous treatment:None    *Have you ever felt you should cut down on drinking?No    *Have people annoyed you by criticizing your drinking?No    *Have you ever felt bad or guilty about your drinking?No    *Have you ever taken an eye-opener to get rid of a hangover?No      Domestic Concerns    Feels highly stressed:No      Employment/School    Status:Retired    Description:Retired     Highest education:Post graduate degree(s)      Exercise    Times per week:Daily    Exercise type:Walking      Home/Environment    Lives with:Spouse    *Living Situation Prior to AdmissionHome/Independent    Home equipment:Walker/Cane    Monitoring Equipment in homeblood pressure cuff    *Special Services and Community Resources Prior to AdmissionNone    *Mobility Assistance Prior to AdmissionIndependent    Home BarriersExternal Stairs, Internal Stairs    *Will patient require additional/new services upon discharge?No      Substance Abuse - Denies Substance Abuse      Tobacco    Type:Cigarettes    Stopped at age:25Years    Previous treatment:None      Family History     Cerebrovascular accident: Other.    Hypertension: Other.      Physical Exam:  Vital signs as per nursing/MA documentation were reviewed  General appearance: Alert and in no acute distress  HEENT: Normal Inspection  Neck - Normal Inspection  Respiratory : No respiratory distress. Lungs are clear   Cardiovascular: heart rate normal. No gallop  Back - normal inspection  Skin inspection:Warm  Musculoskeletal : No deformities  Neuro : Limited exam. Baseline    ROS: Review of symptoms is negative except for what is mentioned in HPI    Results/Data  Records including but not limited to electronic medical records, relevant lab work and imaging from patient's health care facility encounter were reviewed and  independently verified      Charting was completed using voice recognition technology and may include unintended errors.    Discussed with patient/family, RN, and NP.

## 2025-08-28 ENCOUNTER — HOME VISIT (OUTPATIENT)
Dept: POST ACUTE CARE | Facility: EXTERNAL LOCATION | Age: OVER 89
End: 2025-08-28
Payer: MEDICARE

## 2025-08-28 DIAGNOSIS — G25.0 ESSENTIAL TREMOR: ICD-10-CM

## 2025-08-28 DIAGNOSIS — I25.118 CORONARY ARTERY DISEASE OF NATIVE ARTERY OF NATIVE HEART WITH STABLE ANGINA PECTORIS: ICD-10-CM

## 2025-08-28 DIAGNOSIS — M19.90 OSTEOARTHRITIS, UNSPECIFIED OSTEOARTHRITIS TYPE, UNSPECIFIED SITE: ICD-10-CM

## 2025-08-28 DIAGNOSIS — Z00.00 WELLNESS EXAMINATION: Primary | ICD-10-CM

## 2025-08-28 DIAGNOSIS — E78.5 HYPERLIPIDEMIA, UNSPECIFIED HYPERLIPIDEMIA TYPE: ICD-10-CM

## 2025-08-28 PROCEDURE — 99348 HOME/RES VST EST LOW MDM 30: CPT | Performed by: EMERGENCY MEDICINE

## 2025-08-28 PROCEDURE — 99497 ADVNCD CARE PLAN 30 MIN: CPT | Performed by: EMERGENCY MEDICINE

## 2025-08-28 PROCEDURE — G0439 PPPS, SUBSEQ VISIT: HCPCS | Performed by: EMERGENCY MEDICINE
